# Patient Record
Sex: MALE | Race: BLACK OR AFRICAN AMERICAN | URBAN - METROPOLITAN AREA
[De-identification: names, ages, dates, MRNs, and addresses within clinical notes are randomized per-mention and may not be internally consistent; named-entity substitution may affect disease eponyms.]

---

## 2017-04-11 ENCOUNTER — TELEPHONE (OUTPATIENT)
Dept: FAMILY MEDICINE CLINIC | Age: 42
End: 2017-04-11

## 2017-04-11 NOTE — TELEPHONE ENCOUNTER
Left a voicemail asking the pt to give the office a call back in regards to Health Maintenance update.

## 2017-08-16 DIAGNOSIS — I10 ESSENTIAL HYPERTENSION WITH GOAL BLOOD PRESSURE LESS THAN 140/90: ICD-10-CM

## 2017-08-16 RX ORDER — LISINOPRIL AND HYDROCHLOROTHIAZIDE 20; 25 MG/1; MG/1
1 TABLET ORAL DAILY
Qty: 30 TAB | Refills: 0 | Status: SHIPPED | OUTPATIENT
Start: 2017-08-16 | End: 2017-09-06 | Stop reason: SDUPTHER

## 2017-08-16 RX ORDER — LOVASTATIN 20 MG/1
20 TABLET ORAL
Qty: 30 TAB | Refills: 0 | Status: SHIPPED | OUTPATIENT
Start: 2017-08-16 | End: 2017-09-06 | Stop reason: SDUPTHER

## 2017-09-06 ENCOUNTER — OFFICE VISIT (OUTPATIENT)
Dept: FAMILY MEDICINE CLINIC | Age: 42
End: 2017-09-06

## 2017-09-06 VITALS
HEIGHT: 65 IN | BODY MASS INDEX: 52.48 KG/M2 | WEIGHT: 315 LBS | TEMPERATURE: 98.3 F | HEART RATE: 79 BPM | RESPIRATION RATE: 15 BRPM | OXYGEN SATURATION: 97 %

## 2017-09-06 DIAGNOSIS — E66.01 MORBID OBESITY WITH BMI OF 60.0-69.9, ADULT (HCC): ICD-10-CM

## 2017-09-06 DIAGNOSIS — E11.9 TYPE 2 DIABETES MELLITUS WITHOUT COMPLICATION, WITHOUT LONG-TERM CURRENT USE OF INSULIN (HCC): ICD-10-CM

## 2017-09-06 DIAGNOSIS — I10 ESSENTIAL HYPERTENSION WITH GOAL BLOOD PRESSURE LESS THAN 140/90: ICD-10-CM

## 2017-09-06 DIAGNOSIS — Z00.00 REGULAR CHECK-UP: Primary | ICD-10-CM

## 2017-09-06 DIAGNOSIS — I10 ESSENTIAL HYPERTENSION: ICD-10-CM

## 2017-09-06 LAB — GLUCOSE POC: 153 MG/DL

## 2017-09-06 RX ORDER — LISINOPRIL AND HYDROCHLOROTHIAZIDE 20; 25 MG/1; MG/1
1 TABLET ORAL DAILY
Qty: 30 TAB | Refills: 5 | Status: SHIPPED | OUTPATIENT
Start: 2017-09-06 | End: 2018-06-06 | Stop reason: SDUPTHER

## 2017-09-06 RX ORDER — LOVASTATIN 20 MG/1
20 TABLET ORAL
Qty: 30 TAB | Refills: 5 | Status: SHIPPED | OUTPATIENT
Start: 2017-09-06 | End: 2018-06-06 | Stop reason: SDUPTHER

## 2017-09-06 RX ORDER — METFORMIN HYDROCHLORIDE 500 MG/1
500 TABLET ORAL 2 TIMES DAILY WITH MEALS
Qty: 60 TAB | Refills: 5 | Status: SHIPPED | OUTPATIENT
Start: 2017-09-06 | End: 2018-06-06 | Stop reason: SDUPTHER

## 2017-09-06 NOTE — MR AVS SNAPSHOT
Visit Information Date & Time Provider Department Dept. Phone Encounter #  
 9/6/2017  9:45 AM Ruthann Doss MD [address] [place] [address] [id number] Upcoming Health Maintenance Date Due MICROALBUMIN Q1 3/10/1985 EYE EXAM RETINAL OR DILATED Q1 3/10/1985 Pneumococcal 19-64 Medium Risk (1 of 1 - PPSV23) 3/10/1994 DTaP/Tdap/Td series (1 - Tdap) 3/10/1996 HEMOGLOBIN A1C Q6M 6/7/2017 LIPID PANEL Q1 7/11/2017 INFLUENZA AGE 9 TO ADULT 8/1/2017 FOOT EXAM Q1 12/7/2017 Allergies as of 9/6/2017  Review Complete On: 9/6/2017 By: Yahaira Copeland No Known Allergies Current Immunizations  Never Reviewed No immunizations on file. Not reviewed this visit You Were Diagnosed With   
  
 Codes Comments Regular check-up    -  Primary ICD-10-CM: Z00.00 ICD-9-CM: V70.0 Morbid obesity with BMI of 60.0-69.9, adult (HCC)     ICD-10-CM: E66.01, Z68.44 
ICD-9-CM: 278.01, V85.44 Essential hypertension     ICD-10-CM: I10 
ICD-9-CM: 401.9 Type 2 diabetes mellitus without complication, without long-term current use of insulin (HCC)     ICD-10-CM: E11.9 ICD-9-CM: 250.00 Essential hypertension with goal blood pressure less than 140/90     ICD-10-CM: I10 
ICD-9-CM: 401.9 Vitals Pulse Temp Resp Height(growth percentile) Weight(growth percentile) SpO2  
 79 98.3 °F (36.8 °C) (Oral) 15 5' 5\" (1.651 m) (!) 388 lb (176 kg) 97% BMI Smoking Status 64.57 kg/m2 Former Smoker Vitals History BMI and BSA Data Body Mass Index Body Surface Area 64.57 kg/m 2 2.84 m 2 Preferred Pharmacy Pharmacy Name Phone [id number] [address] [phone] Your Updated Medication List  
  
   
This list is accurate as of: 9/6/17 10:28 AM.  Always use your most recent med list.  
  
  
  
  
 lisinopril-hydroCHLOROthiazide 20-25 mg per tablet Commonly known as:  An Mckeon Take 1 Tab by mouth daily. lovastatin 20 mg tablet Commonly known as:  MEVACOR Take 1 Tab by mouth nightly. metFORMIN 500 mg tablet Commonly known as:  GLUCOPHAGE Take 1 Tab by mouth two (2) times daily (with meals). Prescriptions Sent to Pharmacy Refills  
 lovastatin (MEVACOR) 20 mg tablet 5 Sig: Take 1 Tab by mouth nightly. Class: Normal  
 Pharmacy: [id number] [facility] [place] [address]. Ph #: [phone] Route: Oral  
 lisinopril-hydroCHLOROthiazide (PRINZIDE, ZESTORETIC) 20-25 mg per tablet 5 Sig: Take 1 Tab by mouth daily. Class: Normal  
 Pharmacy: [facility] [place] [address]. Ph #: [phone] Route: Oral  
 metFORMIN (GLUCOPHAGE) 500 mg tablet 5 Sig: Take 1 Tab by mouth two (2) times daily (with meals). Class: Normal  
 Pharmacy: [id number] [place] [address]. Ph #: [phone] Route: Oral  
  
We Performed the Following AMB POC GLUCOSE BLOOD, BY GLUCOSE MONITORING DEVICE [68518 CPT(R)] Introducing [facility]! Kelsi Thomson introduces Beam. patient portal. Now you can access parts of your medical record, email your doctor's office, and request medication refills online. 1. In your internet browser, go to [email or web address] 2. Click on the First Time User? Click Here link in the Sign In box. You will see the New Member Sign Up page. 3. Enter your Beam. Access Code exactly as it appears below. You will not need to use this code after youve completed the sign-up process. If you do not sign up before the expiration date, you must request a new code. · Beam. Access Code: [email or web address] Expires: 12/5/2017 10:28 AM 
 
4. Enter the last four digits of your Social Security Number (xxxx) and Date of Birth (mm/dd/yyyy) as indicated and click Submit.  You will be taken to the next sign-up page. 5. Create a Tangerine Power ID. This will be your Tangerine Power login ID and cannot be changed, so think of one that is secure and easy to remember. 6. Create a Tangerine Power password. You can change your password at any time. 7. Enter your Password Reset Question and Answer. This can be used at a later time if you forget your password. 8. Enter your e-mail address. You will receive e-mail notification when new information is available in [address] 9. Click Sign Up. You can now view and download portions of your medical record. 10. Click the Download Summary menu link to download a portable copy of your medical information. If you have questions, please visit the Frequently Asked Questions section of the Tangerine Power website. Remember, Tangerine Power is NOT to be used for urgent needs. For medical emergencies, dial 911. Now available from your iPhone and Android! Please provide this summary of care documentation to your next provider. Your primary care clinician is listed as [address]. If you have any questions after today's visit, please call [phone].

## 2017-09-06 NOTE — PROGRESS NOTES
No chief complaint on file. 1. Have you been to the ER, urgent care clinic since your last visit? Hospitalized since your last visit? No    2. Have you seen or consulted any other health care providers outside of the [address] [place] [address] since your last visit? No    3. When was your last Pap smear? No  When was your last Mammogram? No  When was your last Colon screening? No    PMH/FH/Social Hx reviewed and updated as needed      Applicable screenings reviewed and updated as needed  Medication reconciliation performed. Patient does need medication refills. Health Maintenance reviewed.

## 2017-09-06 NOTE — PROGRESS NOTES
Discharge instructions reviewed with patient    Medication list and understanding of medications reviewed with patient. OTC and herbal medications reviewed and added to med list if applicable  Barriers to adherence assessed. Guidance given regarding new medications this visit, including reason for taking this medicine, and common side effects.     Given AVS with instruction to F/U in  6 months

## 2017-09-06 NOTE — PROGRESS NOTES
HPI  Meño Weldon is a 43 y.o. male being seen today for   Chief Complaint   Patient presents with    Medication Refill     All medications   . he states that he needs med refills. He has been trying to exercise but nothing consistent. He has not changed his diet. Gained 5#        Past Medical History:   Diagnosis Date    Diabetes ([address]     Hypertension          ROS  Patient states that he is feeling well. Denies complaints of chest pain, shortness of breath, swelling of legs, dizziness or weakness. he denies nausea, vomiting or diarrhea. Current Outpatient Prescriptions   Medication Sig    lovastatin (MEVACOR) 20 mg tablet Take 1 Tab by mouth nightly.  lisinopril-hydroCHLOROthiazide (PRINZIDE, ZESTORETIC) 20-25 mg per tablet Take 1 Tab by mouth daily.  metFORMIN (GLUCOPHAGE) 500 mg tablet Take 1 Tab by mouth two (2) times daily (with meals). No current facility-administered medications for this visit. PE  Visit Vitals    Pulse 79    Temp 98.3 °F (36.8 °C) (Oral)    Resp 15    Ht 5' 5\" (1.651 m)    Wt (!) 388 lb (176 kg)    SpO2 97%    BMI 64.57 kg/m2        Alert and oriented with normal mood and affect. he is well developed and well nourished . Lungs are clear without wheezing. Heart rate is regular without murmurs or gallops. There is no lower extremity edema. Results for orders placed or performed in visit on 09/06/17   AMB POC GLUCOSE BLOOD, BY GLUCOSE MONITORING DEVICE   Result Value Ref Range    Glucose  mg/dL         Assessment and Plan:        ICD-10-CM ICD-9-CM    1. Regular check-up Z00.00 V70.0 AMB POC GLUCOSE BLOOD, BY GLUCOSE MONITORING DEVICE   2. Morbid obesity with BMI of 60.0-69.9, adult ([facility]) E66.01 278.01     Z68.44 V85.44    3. Essential hypertension I10 401.9    4. Type 2 diabetes mellitus without complication, without long-term current use of insulin ([facility]) E11.9 250.00    5.  Essential hypertension with goal blood pressure less than 140/90 I10 401.9 lisinopril-hydroCHLOROthiazide (PRINZIDE, ZESTORETIC) 20-25 mg per tablet     Refill as current  Work on wt loss  rtc 6 mos, labs at that visit      Zee Ernst MD

## 2018-06-06 ENCOUNTER — HOSPITAL ENCOUNTER (OUTPATIENT)
Dept: LAB | Age: 43
Discharge: HOME | End: 2018-06-06

## 2018-06-06 ENCOUNTER — OFFICE VISIT (OUTPATIENT)
Dept: FAMILY MEDICINE CLINIC | Age: 43
End: 2018-06-06

## 2018-06-06 VITALS
BODY MASS INDEX: 52.48 KG/M2 | HEART RATE: 89 BPM | OXYGEN SATURATION: 97 % | RESPIRATION RATE: 20 BRPM | WEIGHT: 315 LBS | SYSTOLIC BLOOD PRESSURE: 136 MMHG | DIASTOLIC BLOOD PRESSURE: 83 MMHG | TEMPERATURE: 97.8 F | HEIGHT: 65 IN

## 2018-06-06 DIAGNOSIS — E11.9 TYPE 2 DIABETES MELLITUS WITHOUT COMPLICATION, WITHOUT LONG-TERM CURRENT USE OF INSULIN (HCC): ICD-10-CM

## 2018-06-06 DIAGNOSIS — I10 ESSENTIAL HYPERTENSION WITH GOAL BLOOD PRESSURE LESS THAN 140/90: ICD-10-CM

## 2018-06-06 DIAGNOSIS — E11.9 TYPE 2 DIABETES MELLITUS WITHOUT COMPLICATION, WITHOUT LONG-TERM CURRENT USE OF INSULIN (HCC): Primary | ICD-10-CM

## 2018-06-06 LAB
ALBUMIN SERPL-MCNC: 3.7 G/DL (ref 3.4–5)
ALBUMIN/GLOB SERPL: 0.7 {RATIO} (ref 0.8–1.7)
ALP SERPL-CCNC: 87 U/L (ref 45–117)
ALT SERPL-CCNC: 19 U/L (ref 16–61)
ANION GAP SERPL CALC-SCNC: 8 MMOL/L (ref 3–18)
AST SERPL-CCNC: 14 U/L (ref 15–37)
BILIRUB SERPL-MCNC: 0.2 MG/DL (ref 0.2–1)
BUN SERPL-MCNC: 9 MG/DL (ref 7–18)
BUN/CREAT SERPL: 10 (ref 12–20)
CALCIUM SERPL-MCNC: 8.9 MG/DL (ref 8.5–10.1)
CHLORIDE SERPL-SCNC: 101 MMOL/L (ref 100–108)
CHOLEST SERPL-MCNC: 142 MG/DL
CO2 SERPL-SCNC: 27 MMOL/L (ref 21–32)
CREAT SERPL-MCNC: 0.92 MG/DL (ref 0.6–1.3)
EST. AVERAGE GLUCOSE BLD GHB EST-MCNC: 146 MG/DL
GLOBULIN SER CALC-MCNC: 5.2 G/DL (ref 2–4)
GLUCOSE SERPL-MCNC: 100 MG/DL (ref 74–99)
HBA1C MFR BLD: 6.7 % (ref 4.2–5.6)
HDLC SERPL-MCNC: 35 MG/DL (ref 40–60)
HDLC SERPL: 4.1 {RATIO} (ref 0–5)
LDLC SERPL CALC-MCNC: 81.8 MG/DL (ref 0–100)
LIPID PROFILE,FLP: ABNORMAL
POTASSIUM SERPL-SCNC: 3.9 MMOL/L (ref 3.5–5.5)
PROT SERPL-MCNC: 8.9 G/DL (ref 6.4–8.2)
SODIUM SERPL-SCNC: 136 MMOL/L (ref 136–145)
TRIGL SERPL-MCNC: 126 MG/DL (ref ?–150)
VLDLC SERPL CALC-MCNC: 25.2 MG/DL

## 2018-06-06 PROCEDURE — 80061 LIPID PANEL: CPT | Performed by: FAMILY MEDICINE

## 2018-06-06 PROCEDURE — 83036 HEMOGLOBIN GLYCOSYLATED A1C: CPT | Performed by: FAMILY MEDICINE

## 2018-06-06 PROCEDURE — 80053 COMPREHEN METABOLIC PANEL: CPT | Performed by: FAMILY MEDICINE

## 2018-06-06 RX ORDER — LISINOPRIL AND HYDROCHLOROTHIAZIDE 20; 25 MG/1; MG/1
1 TABLET ORAL DAILY
Qty: 30 TAB | Refills: 5 | Status: SHIPPED | OUTPATIENT
Start: 2018-06-06 | End: 2018-12-19 | Stop reason: SDUPTHER

## 2018-06-06 RX ORDER — METFORMIN HYDROCHLORIDE 500 MG/1
500 TABLET ORAL 2 TIMES DAILY WITH MEALS
Qty: 60 TAB | Refills: 5 | Status: SHIPPED | OUTPATIENT
Start: 2018-06-06 | End: 2018-12-19 | Stop reason: SDUPTHER

## 2018-06-06 RX ORDER — LOVASTATIN 20 MG/1
20 TABLET ORAL
Qty: 30 TAB | Refills: 5 | Status: SHIPPED | OUTPATIENT
Start: 2018-06-06 | End: 2018-08-10 | Stop reason: SDUPTHER

## 2018-06-06 NOTE — PROGRESS NOTES
Discharge instructions reviewed with patient    Medication list and understanding of medications reviewed with patient. OTC and herbal medications reviewed and added to med list if applicable  Barriers to adherence assessed. Guidance given regarding new medications this visit, including reason for taking this medicine, and common side effects. AVS given to patient,,  Also gave patient a new glucometer and strips.

## 2018-06-06 NOTE — PROGRESS NOTES
No chief complaint on file. 1. Have you been to the ER, urgent care clinic since your last visit? Hospitalized since your last visit? No    2. Have you seen or consulted any other health care providers outside of the [facility] since your last visit? No    3. When was your last Pap smear? When was your last Mammogram?   When was your last Colon screening? N/A  PMH/FH/Social Hx reviewed and updated as needed      Applicable screenings reviewed and updated as needed  Medication reconciliation performed. Patient does not need medication refills. Health Maintenance reviewed.

## 2018-06-06 NOTE — MR AVS SNAPSHOT
[address] [facility] [address] [address] 
[phone] Patient: Ling Vieira MRN: [id number] WIO:[id number] Visit Information Date & Time Provider Department Dept. Phone Encounter #  
 6/6/2018  9:45 AM Ruthann Doss MD [address] [place] [address] [id number] Upcoming Health Maintenance Date Due MICROALBUMIN Q1 3/10/1985 EYE EXAM RETINAL OR DILATED Q1 3/10/1985 Pneumococcal 19-64 Medium Risk (1 of 1 - PPSV23) 3/10/1994 DTaP/Tdap/Td series (1 - Tdap) 3/10/1996 HEMOGLOBIN A1C Q6M 6/7/2017 LIPID PANEL Q1 7/11/2017 FOOT EXAM Q1 12/7/2017 Influenza Age 5 to Adult 8/1/2018 Allergies as of 6/6/2018  Review Complete On: 6/6/2018 By: Mariely Record No Known Allergies Current Immunizations  Never Reviewed No immunizations on file. Not reviewed this visit You Were Diagnosed With   
  
 Codes Comments Type 2 diabetes mellitus without complication, without long-term current use of insulin (HCC)    -  Primary ICD-10-CM: E11.9 ICD-9-CM: 250.00 Essential hypertension with goal blood pressure less than 140/90     ICD-10-CM: I10 
ICD-9-CM: 401.9 Vitals BP Pulse Temp Resp Height(growth percentile) 136/83 (BP 1 Location: Left arm, BP Patient Position: Sitting) 89 97.8 °F (36.6 °C) (Temporal) 20 5' 5\" (1.651 m) Weight(growth percentile) SpO2 BMI Smoking Status (!) 376 lb (170.6 kg) 97% 62.57 kg/m2 Former Smoker Vitals History BMI and BSA Data Body Mass Index Body Surface Area 62.57 kg/m 2 2.8 m 2 Preferred Pharmacy Pharmacy Name Phone [address] [place] [address] [phone] Your Updated Medication List  
  
   
This list is accurate as of 6/6/18 10:39 AM.  Always use your most recent med list.  
  
  
  
  
 lisinopril-hydroCHLOROthiazide 20-25 mg per tablet Commonly known as:  Eladio Rivas  
 Take 1 Tab by mouth daily. lovastatin 20 mg tablet Commonly known as:  MEVACOR Take 1 Tab by mouth nightly. metFORMIN 500 mg tablet Commonly known as:  GLUCOPHAGE Take 1 Tab by mouth two (2) times daily (with meals). Prescriptions Sent to Pharmacy Refills  
 lovastatin (MEVACOR) 20 mg tablet 5 Sig: Take 1 Tab by mouth nightly. Class: Normal  
 Pharmacy: [facility] [address] [place] [address] Ph #: [phone] Route: Oral  
 lisinopril-hydroCHLOROthiazide (PRINZIDE, ZESTORETIC) 20-25 mg per tablet 5 Sig: Take 1 Tab by mouth daily. Class: Normal  
 Pharmacy: [facility] [address] [place] [address] Ph #: [phone] Route: Oral  
 metFORMIN (GLUCOPHAGE) 500 mg tablet 5 Sig: Take 1 Tab by mouth two (2) times daily (with meals). Class: Normal  
 Pharmacy: [facility] [address] [place] [address] Ph #: [phone] Route: Oral  
  
Introducing [facility]! Jay Vargas introduces Alum.ni patient portal. Now you can access parts of your medical record, email your doctor's office, and request medication refills online. 1. In your internet browser, go to [email or web address] 2. Click on the First Time User? Click Here link in the Sign In box. You will see the New Member Sign Up page. 3. Enter your Alum.ni Access Code exactly as it appears below. You will not need to use this code after youve completed the sign-up process. If you do not sign up before the expiration date, you must request a new code. · Alum.ni Access Code: [email or web address] Expires: 9/4/2018 10:39 AM 
 
4. Enter the last four digits of your Social Security Number (xxxx) and Date of Birth (mm/dd/yyyy) as indicated and click Submit. You will be taken to the next sign-up page. 5. Create a Alum.ni ID. This will be your ADC Therapeuticst login ID and cannot be changed, so think of one that is secure and easy to remember. 6. Create a Selphee password. You can change your password at any time. 7. Enter your Password Reset Question and Answer. This can be used at a later time if you forget your password. 8. Enter your e-mail address. You will receive e-mail notification when new information is available in [address] 9. Click Sign Up. You can now view and download portions of your medical record. 10. Click the Download Summary menu link to download a portable copy of your medical information. If you have questions, please visit the Frequently Asked Questions section of the Selphee website. Remember, Selphee is NOT to be used for urgent needs. For medical emergencies, dial 911. Now available from your iPhone and Android! Please provide this summary of care documentation to your next provider. Your primary care clinician is listed as [address]. If you have any questions after today's visit, please call [phone].

## 2018-06-06 NOTE — LETTER
6/6/2018 8:55 PM 
 
Mr. Dary Myers [address] 
[address] [facility] Dear Dary Lucia: 
 
Please find your most recent results below. Liver, kidney and electrolytes: NORMAL Blood Sugars: NORMAL  a1c is 6.7 which is the same as last year and under good control Cholesterol: NORMAL Please call me if you have any questions: [phone] Sincerely, Bonifacio Mckinney MD

## 2018-06-06 NOTE — PROGRESS NOTES
HPI  Osei Munoz is a 37 y.o. male being seen today for   Chief Complaint   Patient presents with    Medication Refill     Monitor amd medicate - HTN and Diabetes   . he states that he lost 10# working on diet. He is eating more veggies and changed his dinner time to earlier. Past Medical History:   Diagnosis Date    Diabetes ([address]     Hypertension          ROS  Patient states that he is feeling well. Denies complaints of chest pain, shortness of breath, swelling of legs, dizziness or weakness. he denies nausea, vomiting or diarrhea. Current Outpatient Prescriptions   Medication Sig    lovastatin (MEVACOR) 20 mg tablet Take 1 Tab by mouth nightly.  lisinopril-hydroCHLOROthiazide (PRINZIDE, ZESTORETIC) 20-25 mg per tablet Take 1 Tab by mouth daily.  metFORMIN (GLUCOPHAGE) 500 mg tablet Take 1 Tab by mouth two (2) times daily (with meals). No current facility-administered medications for this visit. PE  Visit Vitals    /83 (BP 1 Location: Left arm, BP Patient Position: Sitting)    Pulse 89    Temp 97.8 °F (36.6 °C) (Temporal)    Resp 20    Ht 5' 5\" (1.651 m)    Wt (!) 376 lb (170.6 kg)    SpO2 97%    BMI 62.57 kg/m2        Alert and oriented with normal mood and affect. he is well developed and well nourished . Lungs are clear without wheezing. Heart rate is regular without murmurs or gallops. There is no lower extremity edema. Results for orders placed or performed in visit on 09/06/17   AMB POC GLUCOSE BLOOD, BY GLUCOSE MONITORING DEVICE   Result Value Ref Range    Glucose  mg/dL         Assessment and Plan:        ICD-10-CM ICD-9-CM    1. Type 2 diabetes mellitus without complication, without long-term current use of insulin ([facility]) C50.1 789.10 METABOLIC PANEL, COMPREHENSIVE      HEMOGLOBIN A1C WITH EAG      LIPID PANEL   2.  Essential hypertension with goal blood pressure less than 140/90 I10 401.9 lisinopril-hydroCHLOROthiazide ([employer]) 20-25 mg per tablet      METABOLIC PANEL, COMPREHENSIVE      HEMOGLOBIN A1C WITH EAG      LIPID PANEL     Labs today  reneew meds  Work on wt loss    6 mos f/u      Quinten Bonilla MD

## 2018-08-10 RX ORDER — LOVASTATIN 20 MG/1
TABLET ORAL
Qty: 30 TAB | Refills: 5 | Status: SHIPPED | OUTPATIENT
Start: 2018-08-10 | End: 2018-12-19 | Stop reason: SDUPTHER

## 2018-09-19 ENCOUNTER — OFFICE VISIT (OUTPATIENT)
Dept: FAMILY MEDICINE CLINIC | Age: 43
End: 2018-09-19

## 2018-09-19 VITALS
DIASTOLIC BLOOD PRESSURE: 73 MMHG | RESPIRATION RATE: 16 BRPM | HEART RATE: 70 BPM | OXYGEN SATURATION: 98 % | SYSTOLIC BLOOD PRESSURE: 137 MMHG | WEIGHT: 315 LBS | TEMPERATURE: 97.3 F | BODY MASS INDEX: 52.48 KG/M2 | HEIGHT: 65 IN

## 2018-09-19 DIAGNOSIS — G56.22 ULNAR NEUROPATHY OF LEFT UPPER EXTREMITY: Primary | ICD-10-CM

## 2018-09-19 NOTE — PROGRESS NOTES
HPI 
Trenton Leo is a 37 y.o. male being seen today for Chief Complaint Patient presents with  Finger Swelling \"pt stated that he been having pain in left hand\" Jere Parikh he states that for a few weeks he has pins and needle left hand. Only in pinky and lateral half of 4th finger. Does rest on his forearms when playing video games. Past Medical History:  
Diagnosis Date  Diabetes ([address]  Hypertension ROS Patient states that he is feeling well. Denies complaints of chest pain, shortness of breath, swelling of legs, dizziness or weakness. he denies nausea, vomiting or diarrhea. Current Outpatient Prescriptions Medication Sig  lovastatin (MEVACOR) 20 mg tablet TAKE ONE TABLET BY MOUTH NIGHTLY  lisinopril-hydroCHLOROthiazide (PRINZIDE, ZESTORETIC) 20-25 mg per tablet Take 1 Tab by mouth daily.  metFORMIN (GLUCOPHAGE) 500 mg tablet Take 1 Tab by mouth two (2) times daily (with meals). No current facility-administered medications for this visit. PE Visit Vitals  /73 (BP 1 Location: Left arm, BP Patient Position: Sitting)  Pulse 70  Temp 97.3 °F (36.3 °C) (Temporal)  Resp 16  
 Ht 5' 5\" (1.651 m)  Wt (!) 369 lb (167.4 kg)  SpO2 98%  BMI 61.4 kg/m2 Alert and oriented with normal mood and affect. he is well developed and well nourished . Normal  strength bilaterally and ROM elbows and wrists Assessment and Plan: ICD-10-CM ICD-9-CM 1. Ulnar neuropathy of left upper extremity G56.22 354.2 Home stretches Avoid pressure on elbow and forearm Follow up jeffry Gary MD

## 2018-09-19 NOTE — PROGRESS NOTES
Discharge instructions reviewed with patient 
  
Medication list and understanding of medications reviewed with patient. OTC and herbal medications reviewed and added to med list if applicable Barriers to adherence assessed. Follow up PRN.

## 2018-09-19 NOTE — PATIENT INSTRUCTIONS
Ulnar Neuropathy (Handlebar Palsy): Exercises Your Care Instructions Here are some examples of typical rehabilitation exercises for your condition. Start each exercise slowly. Ease off the exercise if you start to have pain. Your doctor or your physical or occupational therapist will tell you when you can start these exercises and which ones will work best for you. How to do the exercises Neck rotation 1. Sit in a firm chair, or stand up straight. 2. Keeping your chin level, turn your head to the right, and hold for 15 to 30 seconds. 3. Turn your head to the left, and hold for 15 to 30 seconds. 4. Repeat 2 to 4 times to each side. Shoulder blade squeeze 1. While standing with your arms at your sides, squeeze your shoulder blades together. Do not raise your shoulders as you are squeezing. 2. Hold for 6 seconds. 3. Repeat 8 to 12 times. Neck stretches 1. Look straight ahead, and tip your right ear to your right shoulder. Do not let your left shoulder rise as you tip your head to the right. 2. Hold for 15 to 30 seconds. 3. Tilt your head to the left. Do not let your right shoulder rise as you tip your head to the left. 4. Hold for 15 to 30 seconds. 5. Repeat 2 to 4 times to each side. Elbow flexion and extension If this exercise causes numbness, tingling, or pain in your hand, ease off of the stretch. You should not have symptoms as you stretch. If you cannot back off enough so that you can do the exercise without symptoms, stop doing the exercise right away. 1. Stand with your arms relaxed at your sides. 2. With your affected arm, gently bend your elbow up toward you as far as possible. 3. Then straighten your arm as much as you can. 4. Repeat 2 to 4 times. Wrist flexor stretch If this exercise causes numbness, tingling, or pain in your hand, ease off of the stretch. You should not have symptoms as you stretch.  If you cannot back off enough so that you can do the exercise without symptoms, stop doing the exercise right away. 1. Extend your affected arm in front of you with your palm facing away from your body. 2. Bend back your wrist on your affected arm, pointing your hand up toward the ceiling. 3. With your other hand, gently bend your wrist farther until you feel a mild to moderate stretch in your forearm. 4. Hold for at least 15 to 30 seconds. 5. Repeat 2 to 4 times. 6. Repeat steps 1 through 5, but this time extend your affected arm in front of you with your palm facing up. Then bend back your wrist, pointing your hand toward the floor. Wrist flexion and extension If this exercise causes numbness, tingling, or pain in your hand, ease off of the stretch. You should not have symptoms as you stretch. If you cannot back off enough so that you can do the exercise without symptoms, stop doing the exercise right away. 1. Place your forearm on a table, with your affected hand and wrist extended beyond the table, palm down. 2. Slowly bend your wrist to move your hand upward and allow your hand to close into a fist. Hold for about 6 seconds. 3. Then lower your hand and allow your fingers to relax. Hold this position for about 6 seconds. You should feel a gentle stretch. 4. Repeat 8 to 12 times. Follow-up care is a key part of your treatment and safety. Be sure to make and go to all appointments, and call your doctor if you are having problems. It's also a good idea to know your test results and keep a list of the medicines you take. Where can you learn more? Go to http://margie-gray.info/. Enter E803 in the search box to learn more about \"Ulnar Neuropathy (Handlebar Palsy): Exercises. \" Current as of: November 29, 2017 Content Version: 11.7 © 3450-5445 [employer].  Care instructions adapted under license by [employer] (which disclaims liability or warranty for this information). If you have questions about a medical condition or this instruction, always ask your healthcare professional. [place] [address] any warranty or liability for your use of this information.

## 2018-12-19 DIAGNOSIS — I10 ESSENTIAL HYPERTENSION WITH GOAL BLOOD PRESSURE LESS THAN 140/90: ICD-10-CM

## 2018-12-21 RX ORDER — METFORMIN HYDROCHLORIDE 500 MG/1
500 TABLET ORAL 2 TIMES DAILY WITH MEALS
Qty: 60 TAB | Refills: 5 | Status: SHIPPED | OUTPATIENT
Start: 2018-12-21 | End: 2019-01-14 | Stop reason: SDUPTHER

## 2018-12-21 RX ORDER — LOVASTATIN 20 MG/1
TABLET ORAL
Qty: 30 TAB | Refills: 5 | Status: SHIPPED | OUTPATIENT
Start: 2018-12-21 | End: 2019-01-14 | Stop reason: SDUPTHER

## 2018-12-21 RX ORDER — LISINOPRIL AND HYDROCHLOROTHIAZIDE 20; 25 MG/1; MG/1
1 TABLET ORAL DAILY
Qty: 30 TAB | Refills: 5 | Status: SHIPPED | OUTPATIENT
Start: 2018-12-21 | End: 2019-01-14 | Stop reason: SDUPTHER

## 2019-01-14 ENCOUNTER — OFFICE VISIT (OUTPATIENT)
Dept: FAMILY MEDICINE CLINIC | Age: 44
End: 2019-01-14

## 2019-01-14 VITALS
HEART RATE: 74 BPM | BODY MASS INDEX: 52.48 KG/M2 | TEMPERATURE: 97.1 F | WEIGHT: 315 LBS | DIASTOLIC BLOOD PRESSURE: 78 MMHG | RESPIRATION RATE: 20 BRPM | SYSTOLIC BLOOD PRESSURE: 143 MMHG | HEIGHT: 65 IN | OXYGEN SATURATION: 97 %

## 2019-01-14 DIAGNOSIS — S61.319A: ICD-10-CM

## 2019-01-14 DIAGNOSIS — B35.1 TOENAIL FUNGUS: ICD-10-CM

## 2019-01-14 DIAGNOSIS — I10 ESSENTIAL HYPERTENSION WITH GOAL BLOOD PRESSURE LESS THAN 140/90: ICD-10-CM

## 2019-01-14 DIAGNOSIS — E11.9 TYPE 2 DIABETES MELLITUS WITHOUT COMPLICATION, WITHOUT LONG-TERM CURRENT USE OF INSULIN (HCC): Primary | ICD-10-CM

## 2019-01-14 RX ORDER — LOVASTATIN 20 MG/1
TABLET ORAL
Qty: 30 TAB | Refills: 5 | Status: SHIPPED | OUTPATIENT
Start: 2019-01-14

## 2019-01-14 RX ORDER — LISINOPRIL AND HYDROCHLOROTHIAZIDE 20; 25 MG/1; MG/1
1 TABLET ORAL DAILY
Qty: 30 TAB | Refills: 5 | Status: SHIPPED | OUTPATIENT
Start: 2019-01-14

## 2019-01-14 RX ORDER — TERBINAFINE HYDROCHLORIDE 250 MG/1
250 TABLET ORAL DAILY
Qty: 90 TAB | Refills: 0 | Status: SHIPPED | OUTPATIENT
Start: 2019-01-14

## 2019-01-14 RX ORDER — METFORMIN HYDROCHLORIDE 500 MG/1
500 TABLET ORAL 2 TIMES DAILY WITH MEALS
Qty: 60 TAB | Refills: 5 | Status: SHIPPED | OUTPATIENT
Start: 2019-01-14

## 2019-01-14 NOTE — PROGRESS NOTES
1. Have you been to the ER, urgent care clinic since your last visit? Hospitalized since your last visit? No 
 
2. Have you seen or consulted any other health care providers outside of the [address] [place] [address] since your last visit? Include any pap smears or colon screening.  No

## 2019-01-14 NOTE — PROGRESS NOTES
HPI 
Amauri Bejarano is a 37 y.o. male being seen today for Chief Complaint Patient presents with  Follow Up Chronic Condition  
  medicate abd monitor diabtes and HTN Junior Grimm he states that he cut his finger at work. Keeping it clean and covered. Also has plans to go to [facility] and help a friend recover from knee surgery. Will be gone a few months and requesting refills. He was losing weight but his weight has plateued over the holidays Past Medical History:  
Diagnosis Date  Diabetes ([address]  Hypertension ROS Patient states that he is feeling well. Denies complaints of chest pain, shortness of breath, swelling of legs, dizziness or weakness. he denies nausea, vomiting or diarrhea. Current Outpatient Medications Medication Sig  
 metFORMIN (GLUCOPHAGE) 500 mg tablet Take 1 Tab by mouth two (2) times daily (with meals).  lovastatin (MEVACOR) 20 mg tablet TAKE ONE TABLET BY MOUTH NIGHTLY  lisinopril-hydroCHLOROthiazide (PRINZIDE, ZESTORETIC) 20-25 mg per tablet Take 1 Tab by mouth daily.  terbinafine HCl (LAMISIL) 250 mg tablet Take 1 Tab by mouth daily. No current facility-administered medications for this visit. PE Visit Vitals /78 (BP 1 Location: Right arm, BP Patient Position: Sitting) Pulse 74 Temp 97.1 °F (36.2 °C) (Temporal) Resp 20 Ht 5' 5\" (1.651 m) Wt (!) 370 lb (167.8 kg) SpO2 97% BMI 61.57 kg/m² Alert and oriented with normal mood and affect. he is well developed and well nourished . Lungs are clear without wheezing. Heart rate is regular without murmurs or gallops. There is no lower extremity edema. Fingertip with lacertion involving tip of fingernail and underlying tissue but shallow and no erythema or drainage Foot exam with some callous but normal sensation and pulses. Skin intact. Fungal changes of nails Assessment and Plan: ICD-10-CM ICD-9-CM 1. Type 2 diabetes mellitus without complication, without long-term current use of insulin (HCC) E11.9 250.00  DIABETES FOOT EXAM  
2. Essential hypertension with goal blood pressure less than 140/90 I10 401.9 lisinopril-hydroCHLOROthiazide (PRINZIDE, ZESTORETIC) 20-25 mg per tablet 3. Laceration of finger without foreign body with damage to nail, unspecified finger, unspecified laterality, initial encounter Keep clean and covered. Use abx oint to prevent infection S61.319A 883.0 4.      onchomycosis 
         rx lamisil Liver tests good on review Refill meds Follow up when he returns to [place] [facility] next visit Foot exam today Susu Patel MD